# Patient Record
Sex: FEMALE | Race: WHITE | Employment: OTHER | URBAN - METROPOLITAN AREA
[De-identification: names, ages, dates, MRNs, and addresses within clinical notes are randomized per-mention and may not be internally consistent; named-entity substitution may affect disease eponyms.]

---

## 2017-08-14 PROCEDURE — 82570 ASSAY OF URINE CREATININE: CPT | Performed by: FAMILY MEDICINE

## 2017-08-14 PROCEDURE — 82043 UR ALBUMIN QUANTITATIVE: CPT | Performed by: FAMILY MEDICINE

## 2018-01-09 ENCOUNTER — HOSPITAL ENCOUNTER (EMERGENCY)
Age: 58
Discharge: HOME | End: 2018-01-10
Payer: COMMERCIAL

## 2018-01-09 DIAGNOSIS — M71.22 BAKER CYST, LEFT: Primary | ICD-10-CM

## 2018-01-09 PROCEDURE — 99284 EMERGENCY DEPT VISIT MOD MDM: CPT

## 2018-01-10 ENCOUNTER — APPOINTMENT (OUTPATIENT)
Dept: ULTRASOUND IMAGING | Age: 58
End: 2018-01-10
Payer: COMMERCIAL

## 2018-01-10 VITALS
SYSTOLIC BLOOD PRESSURE: 122 MMHG | HEART RATE: 74 BPM | TEMPERATURE: 97 F | DIASTOLIC BLOOD PRESSURE: 58 MMHG | WEIGHT: 176 LBS | RESPIRATION RATE: 16 BRPM | OXYGEN SATURATION: 100 % | BODY MASS INDEX: 28 KG/M2

## 2018-01-10 PROCEDURE — 93971 EXTREMITY STUDY: CPT

## 2018-01-10 NOTE — ED INITIAL ASSESSMENT (HPI)
Dec 27th 9 hour flight from [facility] . Pt c/o left calf swelling numbness since Sunday. Today c/o pain to left thigh. Denies cp or sob.

## 2018-01-10 NOTE — ED PROVIDER NOTES
Patient Seen in: THE [facility] Emergency Department In [place]    History   Patient presents with:  Deep Vein Thrombosis (cardiovascular)    Stated Complaint: left calf pain and swelling    HPI    Patient presents with complaint of left calf discomfort, desc chain  7-19-10: OTHER SURGICAL HISTORY      Comment: cysto-Stent removal-Dr. Dejesus  2009: OTHER SURGICAL HISTORY      Comment: L5 discectomy with post op numbness  No date: REMOVAL OF TONSILS,12+ Y/O  No date: REPAIR ING HERNIA,5+Y/O,REDUCIBL  No date: SINUS ED Course   Labs Reviewed - No data to display    ED Course as of Carlos 10 0117  ------------------------------------------------------------    US LLE      IMPRESSION:  No evidence of a DVT in the left lower extremity.     5.6 x 3.4 x 1.4 cm poplitea

## 2018-02-16 PROBLEM — I15.2 HYPERTENSION ASSOCIATED WITH DIABETES (HCC): Status: ACTIVE | Noted: 2018-02-16

## 2018-02-16 PROBLEM — E11.69 TYPE 2 DIABETES MELLITUS WITH HYPERLIPIDEMIA (HCC): Status: ACTIVE | Noted: 2018-02-16

## 2018-02-16 PROBLEM — E11.59 HYPERTENSION ASSOCIATED WITH DIABETES (HCC): Status: ACTIVE | Noted: 2018-02-16

## 2018-02-16 PROBLEM — E11.65 UNCONTROLLED TYPE 2 DIABETES MELLITUS WITH HYPERGLYCEMIA, WITHOUT LONG-TERM CURRENT USE OF INSULIN (HCC): Status: ACTIVE | Noted: 2018-02-16

## 2018-02-16 PROBLEM — E78.5 TYPE 2 DIABETES MELLITUS WITH HYPERLIPIDEMIA (HCC): Status: ACTIVE | Noted: 2018-02-16

## 2018-02-16 PROCEDURE — 80061 LIPID PANEL: CPT | Performed by: INTERNAL MEDICINE

## 2018-02-24 PROBLEM — F11.20 NARCOTIC DEPENDENCE (HCC): Status: ACTIVE | Noted: 2018-02-24

## 2018-03-30 PROBLEM — Z86.010 HISTORY OF COLONIC POLYPS: Status: ACTIVE | Noted: 2018-03-30

## 2018-03-30 PROBLEM — Z86.0100 HISTORY OF COLONIC POLYPS: Status: ACTIVE | Noted: 2018-03-30

## 2018-04-19 ENCOUNTER — OFFICE VISIT (OUTPATIENT)
Dept: OBGYN CLINIC | Facility: CLINIC | Age: 58
End: 2018-04-19

## 2018-04-19 VITALS
DIASTOLIC BLOOD PRESSURE: 78 MMHG | SYSTOLIC BLOOD PRESSURE: 130 MMHG | HEIGHT: 66 IN | BODY MASS INDEX: 29.57 KG/M2 | WEIGHT: 184 LBS | RESPIRATION RATE: 18 BRPM

## 2018-04-19 DIAGNOSIS — N76.0 VAGINITIS AND VULVOVAGINITIS: ICD-10-CM

## 2018-04-19 DIAGNOSIS — Z12.39 SCREENING FOR MALIGNANT NEOPLASM OF BREAST: ICD-10-CM

## 2018-04-19 DIAGNOSIS — Z01.419 WELL WOMAN EXAM WITH ROUTINE GYNECOLOGICAL EXAM: Primary | ICD-10-CM

## 2018-04-19 DIAGNOSIS — Z12.4 SCREENING FOR MALIGNANT NEOPLASM OF CERVIX: ICD-10-CM

## 2018-04-19 PROCEDURE — 87480 CANDIDA DNA DIR PROBE: CPT | Performed by: OBSTETRICS & GYNECOLOGY

## 2018-04-19 PROCEDURE — 88175 CYTOPATH C/V AUTO FLUID REDO: CPT | Performed by: OBSTETRICS & GYNECOLOGY

## 2018-04-19 PROCEDURE — 99386 PREV VISIT NEW AGE 40-64: CPT | Performed by: OBSTETRICS & GYNECOLOGY

## 2018-04-19 PROCEDURE — 87510 GARDNER VAG DNA DIR PROBE: CPT | Performed by: OBSTETRICS & GYNECOLOGY

## 2018-04-19 PROCEDURE — 87660 TRICHOMONAS VAGIN DIR PROBE: CPT | Performed by: OBSTETRICS & GYNECOLOGY

## 2018-04-19 PROCEDURE — 87624 HPV HI-RISK TYP POOLED RSLT: CPT | Performed by: OBSTETRICS & GYNECOLOGY

## 2018-04-19 NOTE — PROGRESS NOTES
Ivelisse Meek is a 61year old female  No LMP recorded (approximate). Patient has had an ablation. Patient presents with:  Wellness Visit: annual  .  She has no complaints. Denies postmenopausal bleeding, urinary or sexual issues.   Planes of lymph node removed, right cervical chain  7-19-10: OTHER SURGICAL HISTORY      Comment: cysto-Stent removal-Dr. Dejesus  2009: OTHER SURGICAL HISTORY      Comment: L5 discectomy with post op numbness  No date: REMOVAL OF TONSILS,12+ Y/O  No date: REPAIR ING HER tablet (40 mg total) by mouth nightly., Disp: 30 tablet, Rfl: 2  •  Insulin Degludec (TRESIBA FLEXTOUCH) 100 UNIT/ML Subcutaneous Solution Pen-injector, Inject 10 Units into the skin daily. , Disp: 15 mL, Rfl: 0  •  glimepiride 4 MG Oral Tab, Take 2 tablets nourished  Head/Face: normocephalic  Neck/Thyroid: thyroid symmetric, no thyromegaly, no nodules, no adenopathy  Breast: normal without palpable masses, tenderness, asymmetry, nipple discharge, nipple retraction or skin changes  Abdomen:  soft, nontender,

## 2018-04-26 PROCEDURE — 88305 TISSUE EXAM BY PATHOLOGIST: CPT | Performed by: INTERNAL MEDICINE

## 2018-05-23 PROBLEM — Z79.4 UNCONTROLLED TYPE 2 DIABETES MELLITUS WITH DIABETIC POLYNEUROPATHY, WITH LONG-TERM CURRENT USE OF INSULIN (HCC): Status: ACTIVE | Noted: 2018-05-23

## 2018-05-23 PROBLEM — IMO0002 UNCONTROLLED TYPE 2 DIABETES MELLITUS WITH MICROALBUMINURIA, WITH LONG-TERM CURRENT USE OF INSULIN: Status: ACTIVE | Noted: 2018-05-23

## 2018-05-23 PROBLEM — IMO0002 UNCONTROLLED TYPE 2 DIABETES MELLITUS WITH DIABETIC POLYNEUROPATHY, WITH LONG-TERM CURRENT USE OF INSULIN: Status: ACTIVE | Noted: 2018-05-23

## 2018-05-23 PROBLEM — E11.65 UNCONTROLLED TYPE 2 DIABETES MELLITUS WITH DIABETIC POLYNEUROPATHY, WITH LONG-TERM CURRENT USE OF INSULIN (HCC): Status: ACTIVE | Noted: 2018-05-23

## 2018-05-23 PROBLEM — E11.42 UNCONTROLLED TYPE 2 DIABETES MELLITUS WITH DIABETIC POLYNEUROPATHY, WITH LONG-TERM CURRENT USE OF INSULIN (HCC): Status: ACTIVE | Noted: 2018-05-23

## 2018-05-23 PROBLEM — R80.9 UNCONTROLLED TYPE 2 DIABETES MELLITUS WITH MICROALBUMINURIA, WITH LONG-TERM CURRENT USE OF INSULIN (HCC): Status: ACTIVE | Noted: 2018-05-23

## 2018-05-23 PROBLEM — Z79.4 UNCONTROLLED TYPE 2 DIABETES MELLITUS WITH MICROALBUMINURIA, WITH LONG-TERM CURRENT USE OF INSULIN (HCC): Status: ACTIVE | Noted: 2018-05-23

## 2018-05-23 PROBLEM — E11.29 UNCONTROLLED TYPE 2 DIABETES MELLITUS WITH MICROALBUMINURIA, WITH LONG-TERM CURRENT USE OF INSULIN (HCC): Status: ACTIVE | Noted: 2018-05-23

## 2018-05-23 PROBLEM — E11.65 UNCONTROLLED TYPE 2 DIABETES MELLITUS WITH MICROALBUMINURIA, WITH LONG-TERM CURRENT USE OF INSULIN (HCC): Status: ACTIVE | Noted: 2018-05-23

## 2018-10-04 PROCEDURE — 82570 ASSAY OF URINE CREATININE: CPT | Performed by: INTERNAL MEDICINE

## 2018-10-04 PROCEDURE — 80061 LIPID PANEL: CPT | Performed by: INTERNAL MEDICINE

## 2018-10-04 PROCEDURE — 83721 ASSAY OF BLOOD LIPOPROTEIN: CPT | Performed by: INTERNAL MEDICINE

## 2018-10-04 PROCEDURE — 82043 UR ALBUMIN QUANTITATIVE: CPT | Performed by: INTERNAL MEDICINE

## 2018-10-16 ENCOUNTER — HOSPITAL ENCOUNTER (EMERGENCY)
Age: 58
Discharge: HOME | End: 2018-10-16
Attending: EMERGENCY MEDICINE
Payer: COMMERCIAL

## 2018-10-16 VITALS
RESPIRATION RATE: 18 BRPM | WEIGHT: 148 LBS | SYSTOLIC BLOOD PRESSURE: 132 MMHG | DIASTOLIC BLOOD PRESSURE: 78 MMHG | HEART RATE: 70 BPM | TEMPERATURE: 98 F | HEIGHT: 66 IN | OXYGEN SATURATION: 99 % | BODY MASS INDEX: 23.78 KG/M2

## 2018-10-16 DIAGNOSIS — Z77.29 CARBON MONOXIDE EXPOSURE: Primary | ICD-10-CM

## 2018-10-16 PROCEDURE — 99282 EMERGENCY DEPT VISIT SF MDM: CPT

## 2018-10-16 NOTE — ED PROVIDER NOTES
Patient Seen in: [facility] Emergency Department In [place]    History   Patient presents with: Other    Stated Complaint: PT fears carbon monoxide poisoning.  Asymptomatic    HPI    Patient was in her garage today running a small propane fireplace to jayme OTHER SURGICAL HISTORY  2009    L5 discectomy with post op numbness   • REMOVAL OF TONSILS,12+ Y/O     • REPAIR ING HERNIA,5+Y/O,REDUCIBL     • SINUS SURGERY       • SPINE SURGERY PROCEDURE UNLISTED     • TONSILLECTOMY     • X-RAY RETROGRADE PYELOGRAM  7/1 Prescribed:  Discharge Medication List as of 10/16/2018 12:43 AM

## 2019-05-03 ENCOUNTER — APPOINTMENT (OUTPATIENT)
Dept: CT IMAGING | Age: 59
End: 2019-05-03
Attending: EMERGENCY MEDICINE
Payer: COMMERCIAL

## 2019-05-03 ENCOUNTER — HOSPITAL ENCOUNTER (EMERGENCY)
Age: 59
Discharge: HOME | End: 2019-05-03
Attending: EMERGENCY MEDICINE
Payer: COMMERCIAL

## 2019-05-03 VITALS
DIASTOLIC BLOOD PRESSURE: 68 MMHG | WEIGHT: 185 LBS | RESPIRATION RATE: 16 BRPM | HEART RATE: 76 BPM | TEMPERATURE: 99 F | SYSTOLIC BLOOD PRESSURE: 118 MMHG | OXYGEN SATURATION: 98 % | HEIGHT: 66 IN | BODY MASS INDEX: 29.73 KG/M2

## 2019-05-03 DIAGNOSIS — E86.0 DEHYDRATION: ICD-10-CM

## 2019-05-03 DIAGNOSIS — R19.7 DIARRHEA, UNSPECIFIED TYPE: Primary | ICD-10-CM

## 2019-05-03 PROCEDURE — 81001 URINALYSIS AUTO W/SCOPE: CPT | Performed by: EMERGENCY MEDICINE

## 2019-05-03 PROCEDURE — 96375 TX/PRO/DX INJ NEW DRUG ADDON: CPT

## 2019-05-03 PROCEDURE — 85025 COMPLETE CBC W/AUTO DIFF WBC: CPT | Performed by: EMERGENCY MEDICINE

## 2019-05-03 PROCEDURE — 80053 COMPREHEN METABOLIC PANEL: CPT | Performed by: EMERGENCY MEDICINE

## 2019-05-03 PROCEDURE — 74176 CT ABD & PELVIS W/O CONTRAST: CPT | Performed by: EMERGENCY MEDICINE

## 2019-05-03 PROCEDURE — 96374 THER/PROPH/DIAG INJ IV PUSH: CPT

## 2019-05-03 PROCEDURE — 96361 HYDRATE IV INFUSION ADD-ON: CPT

## 2019-05-03 PROCEDURE — 99284 EMERGENCY DEPT VISIT MOD MDM: CPT

## 2019-05-03 PROCEDURE — 83690 ASSAY OF LIPASE: CPT | Performed by: EMERGENCY MEDICINE

## 2019-05-03 RX ORDER — ONDANSETRON 8 MG/1
8 TABLET, ORALLY DISINTEGRATING ORAL EVERY 6 HOURS PRN
Qty: 10 TABLET | Refills: 0 | Status: SHIPPED | OUTPATIENT
Start: 2019-05-03 | End: 2019-05-10

## 2019-05-03 RX ORDER — KETOROLAC TROMETHAMINE 30 MG/ML
30 INJECTION, SOLUTION INTRAMUSCULAR; INTRAVENOUS ONCE
Status: COMPLETED | OUTPATIENT
Start: 2019-05-03 | End: 2019-05-03

## 2019-05-03 RX ORDER — ONDANSETRON 2 MG/ML
4 INJECTION INTRAMUSCULAR; INTRAVENOUS
Status: DISCONTINUED | OUTPATIENT
Start: 2019-05-03 | End: 2019-05-03

## 2019-05-03 NOTE — ED NOTES
REPORT FROM [facility] BRETT RN  PT FEELING BETTER AT THIS TIME  AWAITING DISPO  FAM BEDSIDE

## 2019-05-03 NOTE — ED PROVIDER NOTES
Patient Seen in: [facility] Emergency Department In [place]    History   Patient presents with:  Abdomen/Flank Pain (GI/)    Stated Complaint: back pain, abdominal pain, & diarrhea    HPI    Patient with a history of hypertension and hyperlipidemia as w •      • COLONOSCOPY     • CYSTO, WITH INSERTION OF STENT Right 2010    Performed by Kaci Figueredo MD at [address] [place] [address]   • ENDOMETRIAL ABLATION     • ESWL LITHOTRIPSY WITH CYSTOSCOPY, STENT PLACEMENT Right 2010    Performe Lungs: Clear to auscultation bilaterally. No rhonchi or rales. Heart: Normal S1 and S2, without murmur or rub. Distal pulses are strong and symmetric. Abdomen: Soft, nondistended. Mildly diffusely tender across lower abdomen bilaterally.   No CVA ten orders. MDM   Patient complains of frequency of urination ongoing for a week without any dysuria. Urinary tract infections are considered.   She does have some pain in the left flank reminiscent of what she experienced with renal colic and cer safely discharged home. I recommend rest, fluids, light diet as tolerated, nausea medicine, and antidiarrheal medicines. I recommend close follow-up with her primary care doctor.     Disposition and Plan     Clinical Impression:  Diarrhea, unspecified typ

## 2019-06-26 ENCOUNTER — HOSPITAL ENCOUNTER (OUTPATIENT)
Age: 59
Discharge: HOME | End: 2019-06-26
Attending: FAMILY MEDICINE
Payer: COMMERCIAL

## 2019-06-26 ENCOUNTER — APPOINTMENT (OUTPATIENT)
Dept: GENERAL RADIOLOGY | Age: 59
End: 2019-06-26
Attending: FAMILY MEDICINE
Payer: COMMERCIAL

## 2019-06-26 VITALS
OXYGEN SATURATION: 98 % | DIASTOLIC BLOOD PRESSURE: 71 MMHG | TEMPERATURE: 98 F | HEART RATE: 68 BPM | RESPIRATION RATE: 16 BRPM | SYSTOLIC BLOOD PRESSURE: 152 MMHG

## 2019-06-26 DIAGNOSIS — R14.0 ABDOMINAL BLOATING: ICD-10-CM

## 2019-06-26 DIAGNOSIS — K59.00 CONSTIPATION, UNSPECIFIED CONSTIPATION TYPE: Primary | ICD-10-CM

## 2019-06-26 PROCEDURE — 99213 OFFICE O/P EST LOW 20 MIN: CPT

## 2019-06-26 PROCEDURE — 74018 RADEX ABDOMEN 1 VIEW: CPT | Performed by: FAMILY MEDICINE

## 2019-06-26 PROCEDURE — 81002 URINALYSIS NONAUTO W/O SCOPE: CPT | Performed by: FAMILY MEDICINE

## 2019-06-26 NOTE — ED PROVIDER NOTES
Patient Seen in: [facility] In [place]    History   Patient presents with:  Abdomen/Flank Pain (GI/)    Stated Complaint: abd swelling/pain    HPI    This 50-year-old female presents the office with complaint of abdominal pain and bloati lymph node removed, right cervical chain   • OTHER SURGICAL HISTORY  7-19-10    cysto-Stent removal-Dr. Mis Wheeler   • OTHER SURGICAL HISTORY  2009    L5 discectomy with post op numbness   • REMOVAL OF TONSILS,11+ Y/O     • REPAIR ING HERNIA,5+Y/O,REDUCIBL noted.      ED Course     Labs Reviewed   POCT URINALYSIS DIPSTICK - Abnormal; Notable for the following components:       Result Value    Blood, Urine Small (*)     All other components within normal limits          Xr Abdomen (1 View) (cpt=74018)    Resu bloating    Disposition:  Discharge  6/26/2019  2:13 pm    Follow-up:  Michael Bocanegra, [address] [facility] Laci Martinez [address] [place]    Schedule an appointment as soon as possible for a visit in 3 days  If symptoms worsen        Medication

## 2019-06-26 NOTE — ED INITIAL ASSESSMENT (HPI)
Pt has been having abdominal pain and bloating. She is normally slightly constipated, but her bowel habits have been normal  Today she is more bloated, and experiencing back pain. She denies nausea vomiting or diarrhea, and has no urinary symptoms.   Stat

## 2019-09-25 NOTE — ED AVS SNAPSHOT
Rhett Ravi   MRN: [id number]    Department:  [facility] in [place]   Date of Visit:  1/9/2018           Disclosure     Insurance plans vary and the physician(s) referred by the ER may not be covered by your plan.  Please con tell this physician (or your personal doctor if your instructions are to return to your personal doctor) about any new or lasting problems. The primary care or specialist physician will see patients referred from the [facility] Emergency Department.  [facility]

## 2022-02-03 ENCOUNTER — APPOINTMENT (OUTPATIENT)
Dept: GENERAL RADIOLOGY | Facility: HOSPITAL | Age: 62
End: 2022-02-03
Attending: EMERGENCY MEDICINE
Payer: COMMERCIAL

## 2022-02-03 PROCEDURE — 71045 X-RAY EXAM CHEST 1 VIEW: CPT | Performed by: EMERGENCY MEDICINE

## 2022-02-03 NOTE — ED INITIAL ASSESSMENT (HPI)
Pt arrives with complaints of shortness of breath for the past 3 days worse this morning upon waking. Pt grew concerned when she developed bilateral arm and right flank pruritic rash.  Pt has been under a lot of stress lately

## 2022-06-08 NOTE — ED INITIAL ASSESSMENT (HPI)
Abdomen, soft, nontender, nondistended, no guarding or rigidity, no masses palpable, normal bowel sounds, Liver and Spleen, no hepatomegaly present, no hepatosplenomegaly, liver nontender, spleen not palpable LLQ pain today. C/O left sided back/flank pain x 2 days. C/O urinary frequency x 4 days. Diarrhea today. No fever.

## 2023-02-13 ENCOUNTER — LAB REQUISITION (OUTPATIENT)
Dept: LAB | Facility: HOSPITAL | Age: 63
End: 2023-02-13
Payer: COMMERCIAL

## 2023-02-13 DIAGNOSIS — M67.431 GANGLION, RIGHT WRIST: ICD-10-CM

## 2023-02-13 PROCEDURE — 88304 TISSUE EXAM BY PATHOLOGIST: CPT | Performed by: ORTHOPAEDIC SURGERY

## 2024-10-21 ENCOUNTER — APPOINTMENT (OUTPATIENT)
Dept: ULTRASOUND IMAGING | Facility: HOSPITAL | Age: 64
End: 2024-10-21
Attending: EMERGENCY MEDICINE
Payer: COMMERCIAL

## 2024-10-21 ENCOUNTER — HOSPITAL ENCOUNTER (EMERGENCY)
Facility: HOSPITAL | Age: 64
Discharge: HOME | End: 2024-10-21
Attending: EMERGENCY MEDICINE
Payer: COMMERCIAL

## 2024-10-21 VITALS
HEIGHT: 66 IN | OXYGEN SATURATION: 99 % | TEMPERATURE: 99 F | BODY MASS INDEX: 23.14 KG/M2 | HEART RATE: 68 BPM | DIASTOLIC BLOOD PRESSURE: 70 MMHG | WEIGHT: 144 LBS | RESPIRATION RATE: 19 BRPM | SYSTOLIC BLOOD PRESSURE: 101 MMHG

## 2024-10-21 DIAGNOSIS — M25.562 ARTHRALGIA OF BOTH LOWER LEGS: Primary | ICD-10-CM

## 2024-10-21 DIAGNOSIS — M25.561 ARTHRALGIA OF BOTH LOWER LEGS: Primary | ICD-10-CM

## 2024-10-21 PROCEDURE — 99284 EMERGENCY DEPT VISIT MOD MDM: CPT

## 2024-10-21 PROCEDURE — 93970 EXTREMITY STUDY: CPT | Performed by: EMERGENCY MEDICINE

## 2024-10-21 NOTE — ED INITIAL ASSESSMENT (HPI)
Pt to ed states she was wants to be tested for guillain barre syndrome. Pt states she had nerve burning on both legs. Pt states she was told to take 1600mg of her gabapentin. Pt has hx of neuropathy. Pt states she is on 6 days of constipation.

## 2024-10-25 NOTE — ED PROVIDER NOTES
Patient Seen in: [facility] Emergency Department    History     Chief Complaint   Patient presents with    Other     Stated Complaint: leg pain    HPI    HPI: Mable Henderson is a 63 year old female who presents with burning in both lower legs knees down.  Long hx neuropathy worried could be recurrence of guillain barre.  No resp symptoms.  Waxing waning buring in lower legs no weakness.  Had procedure on r hip recently.  Past Medical History:    Anxiety state    BACK PAIN    sees pain management dr. Akins    Back problem    Calculus of kidney    Chronic sinusitis    Colitis    Disorder of thyroid    DM (diabetes mellitus) (HCC)    Fibromyalgia    High cholesterol    Hypertriglyceridemia    Hypothyroid    KIDNEY STONE    Migraines    Muscle weakness    Neuropathy    Patella, chondromalacia, left    Pneumonia, organism unspecified(486)    Shortness of breath       Past Surgical History:   Procedure Laterality Date    Adenoidectomy            Colonoscopy      Cystoscopy,insert ureteral stent  2010    Performed by ANAYELI BLANKENSHIP at [facility], [facility]    Cystouretro w/stone remove  2010    Performed by ANAYELI BLANKENSHIP at [facility], [facility]    Endometrial ablation      Fragmenting of kidney stone  2010    Performed by ANAYELI BLANKENSHIP at [facility], [facility]    Hernia surgery      umbilical     Lithotripsy      x 3    Oophorectomy      left only    Other surgical history      c6-7 fusion    Other surgical history      lymph node removed, right cervical chain    Other surgical history  7-19-10    cysto-Stent removal-Dr. Dejesus    Other surgical history      L5 discectomy with post op numbness    Removal of tonsils,11+ y/o      Repair ing hernia,4+y/o,reducibl      Sinus surgery        Spine surgery procedure unlisted      Tonsillectomy      X-ray retrograde pyelogram  2010    Performed by ANAYELI BLANKENSHIP at [facility], [facility]            Family History    Problem Relation Age of Onset    Heart Disorder Brother 45        MI    Cancer Mother     Heart Disorder Father     Other (Other) Father     Cancer Sister         thyroid cancer    Thyroid disease Sister     Other (Other) Sister     Obesity Daughter     Other (Other) Daughter     Diabetes Maternal Grandmother     Cancer Paternal Grandfather     Breast Cancer Paternal Aunt 55        dx age 55    Breast Cancer Paternal Aunt 49        dx age 49       Social History     Socioeconomic History    Marital status:    Tobacco Use    Smoking status: Former     Current packs/day: 0.00     Types: Cigarettes     Quit date: 1989     Years since quittin.2    Smokeless tobacco: Never   Substance and Sexual Activity    Alcohol use: Yes     Alcohol/week: 0.0 - 1.0 standard drinks of alcohol     Comment: occ    Drug use: No    Sexual activity: Yes     Partners: Male   Other Topics Concern    Blood Transfusions No    Caffeine Concern No     Comment: 0-1 cups per day    Sleep Concern Yes     Comment: insomnia that has been chronic    Stress Concern Yes     Comment: mild anxiety       Review of Systems    Positive for stated complaint: leg pain  Other systems are as noted in HPI.  Constitutional and vital signs reviewed.      All other systems reviewed and negative except as noted above.    PSFH elements reviewed from today and agreed except as otherwise stated in HPI.    Physical Exam     ED Triage Vitals [10/21/24 1715]   /75   Pulse 89   Resp 18   Temp 98.7 °F (37.1 °C)   Temp src Temporal   SpO2 98 %   O2 Device None (Room air)       Current:/70   Pulse 68   Temp 98.7 °F (37.1 °C) (Temporal)   Resp 19   Ht 167.6 cm (5' 6\")   Wt 65.3 kg   SpO2 99%   BMI 23.24 kg/m²         Physical Exam      MENTAL STATUS: Alert, oriented, and cooperative. No focal deficit  HEAD: Atraumatic  NECK: Supple, full range of motion without pain or paresthesias  EXTREMITIES: no erythema or warmth, 2+ dtr b knee jerk,  sensation intact aching in calf muscles no overt tenderness no erythema or warmth  .  2+ distal pulses.  NEURO:Sensation to touch is intact.  SKIN: No open wounds, no rashes.  PSYCH: Normal affect. Calm and cooperative.    Differential diagnosis to include fneuropathy less likely dvt no evidence of guillain barre.    ED Course   Labs Reviewed - No data to display    MDM     Radiology:    @No results found.    Medical Decision Making  Problems Addressed:  Arthralgia of both lower legs: acute illness or injury    Amount and/or Complexity of Data Reviewed  Radiology: ordered. Decision-making details documented in ED Course.  Discussion of management or test interpretation with external provider(s): Tylenol, motrin recommended.      Risk  OTC drugs.            Disposition and Plan     Clinical Impression:  1. Arthralgia of both lower legs        Disposition:  Discharge    Follow-up:  Elysia Jacob DO  [address] [place] [address]  [phone]    Follow up        Medications Prescribed:  Discharge Medication List as of 10/21/2024  7:16 PM

## (undated) NOTE — ED AVS SNAPSHOT
Carlee Glynn   MRN: [id number]    Department:  THE [facility] Emergency Department in [place]   Date of Visit:  5/3/2019           Disclosure     Insurance plans vary and the physician(s) referred by the ER may not be covered by your plan.  Please con tell this physician (or your personal doctor if your instructions are to return to your personal doctor) about any new or lasting problems. The primary care or specialist physician will see patients referred from the [facility] Emergency Department.  Yolie Meek

## (undated) NOTE — ED AVS SNAPSHOT
Barbaralevon Stevenismael   MRN: [id number]    Department:  [facility] Emergency Department in [place]   Date of Visit:  10/16/2018           Disclosure     Insurance plans vary and the physician(s) referred by the ER may not be covered by your plan.  Please c tell this physician (or your personal doctor if your instructions are to return to your personal doctor) about any new or lasting problems. The primary care or specialist physician will see patients referred from the [facility] Emergency Department.  Marco Hoffman